# Patient Record
Sex: MALE | Race: BLACK OR AFRICAN AMERICAN | NOT HISPANIC OR LATINO | Employment: OTHER | URBAN - METROPOLITAN AREA
[De-identification: names, ages, dates, MRNs, and addresses within clinical notes are randomized per-mention and may not be internally consistent; named-entity substitution may affect disease eponyms.]

---

## 2019-08-16 PROBLEM — H43.12 VITREOUS HEMORRHAGE, LEFT: Status: ACTIVE | Noted: 2019-08-16

## 2019-08-16 PROBLEM — H34.8392 BRANCH RETINAL VEIN OCCLUSION: Status: ACTIVE | Noted: 2019-08-16

## 2019-09-20 PROBLEM — Z99.2 ESRD (END STAGE RENAL DISEASE) ON DIALYSIS: Status: ACTIVE | Noted: 2019-09-20

## 2019-09-20 PROBLEM — N18.6 ESRD (END STAGE RENAL DISEASE) ON DIALYSIS: Status: ACTIVE | Noted: 2019-09-20

## 2019-12-20 PROBLEM — Z00.00 HEALTHCARE MAINTENANCE: Status: ACTIVE | Noted: 2019-12-20

## 2019-12-20 PROBLEM — M17.11 OSTEOARTHRITIS OF RIGHT KNEE: Status: ACTIVE | Noted: 2019-12-20

## 2020-03-23 PROBLEM — Z00.00 HEALTHCARE MAINTENANCE: Status: RESOLVED | Noted: 2019-12-20 | Resolved: 2020-03-23

## 2020-06-22 PROBLEM — Z00.00 HEALTHCARE MAINTENANCE: Status: RESOLVED | Noted: 2019-12-20 | Resolved: 2020-06-22

## 2020-07-06 PROBLEM — E11.3591 PROLIFERATIVE DIABETIC RETINOPATHY OF RIGHT EYE WITHOUT MACULAR EDEMA ASSOCIATED WITH TYPE 2 DIABETES MELLITUS: Status: ACTIVE | Noted: 2020-07-06

## 2020-11-02 PROBLEM — E11.3512 PROLIFERATIVE DIABETIC RETINOPATHY OF LEFT EYE WITH MACULAR EDEMA ASSOCIATED WITH TYPE 2 DIABETES MELLITUS: Status: ACTIVE | Noted: 2020-11-02

## 2020-11-13 PROBLEM — K57.90 DIVERTICULOSIS: Status: ACTIVE | Noted: 2020-11-13

## 2020-11-13 PROBLEM — Z12.11 ENCOUNTER FOR COLORECTAL CANCER SCREENING: Status: ACTIVE | Noted: 2020-11-13

## 2020-11-13 PROBLEM — Z12.12 ENCOUNTER FOR COLORECTAL CANCER SCREENING: Status: ACTIVE | Noted: 2020-11-13

## 2021-06-21 PROBLEM — T82.868A THROMBOSIS OF ARTERIOVENOUS FISTULA: Status: ACTIVE | Noted: 2021-06-21

## 2023-03-06 ENCOUNTER — PES CALL (OUTPATIENT)
Dept: ADMINISTRATIVE | Facility: OTHER | Age: 53
End: 2023-03-06
Payer: MEDICARE

## 2023-03-21 PROBLEM — H25.812 COMBINED FORM OF AGE-RELATED CATARACT, LEFT EYE: Status: ACTIVE | Noted: 2023-03-21

## 2023-03-23 PROBLEM — T82.49XA CLOTTED DIALYSIS ACCESS: Status: ACTIVE | Noted: 2023-03-23

## 2023-03-23 PROBLEM — Z99.2 DEPENDENCE ON HEMODIALYSIS: Status: ACTIVE | Noted: 2023-03-23

## 2023-04-05 ENCOUNTER — PES CALL (OUTPATIENT)
Dept: ADMINISTRATIVE | Facility: OTHER | Age: 53
End: 2023-04-05
Payer: MEDICARE

## 2023-05-02 PROBLEM — T88.4XXA HARD TO INTUBATE: Status: ACTIVE | Noted: 2023-05-02

## 2023-06-05 PROBLEM — T82.898A PROBLEM WITH DIALYSIS ACCESS: Status: ACTIVE | Noted: 2023-06-05

## 2023-06-15 LAB — % HEMOGLOBIN A1C: 11.2

## 2023-08-30 ENCOUNTER — PATIENT OUTREACH (OUTPATIENT)
Dept: ADMINISTRATIVE | Facility: HOSPITAL | Age: 53
End: 2023-08-30
Payer: MEDICARE

## 2024-03-18 PROBLEM — T82.858A ARTERIOVENOUS FISTULA STENOSIS: Status: ACTIVE | Noted: 2024-03-18

## 2025-05-16 ENCOUNTER — PATIENT OUTREACH (OUTPATIENT)
Dept: ADMINISTRATIVE | Facility: HOSPITAL | Age: 55
End: 2025-05-16
Payer: MEDICAID

## 2025-05-16 NOTE — PROGRESS NOTES
5-16-25- please address open care gap collect A1c noted on 5-19-25 appt notes    Care everywhere updated